# Patient Record
Sex: FEMALE | URBAN - NONMETROPOLITAN AREA
[De-identification: names, ages, dates, MRNs, and addresses within clinical notes are randomized per-mention and may not be internally consistent; named-entity substitution may affect disease eponyms.]

---

## 2023-08-10 ENCOUNTER — TELEPHONE (OUTPATIENT)
Dept: ENDOCRINOLOGY | Facility: CLINIC | Age: 58
End: 2023-08-10
Payer: OTHER GOVERNMENT

## 2023-08-10 NOTE — TELEPHONE ENCOUNTER
"This pt called stating she needs blood work done before December because her doctor want a \"specialty\" doctor to check out her calcium. Pt was told she would be put on a cancellation list. Pt was very frustrated when she was told her appt will be in December. Pt is  very worried and said she recently broke both ankles. Pt was told we have a lot of new pts that want to get in sooner. Pt was told we would do our best to watch for cancellations.      Please advise    Thank you  "

## 2023-08-22 ENCOUNTER — TELEPHONE (OUTPATIENT)
Dept: ENDOCRINOLOGY | Facility: CLINIC | Age: 58
End: 2023-08-22
Payer: OTHER GOVERNMENT

## 2023-08-22 NOTE — TELEPHONE ENCOUNTER
Ms. Daylin Robles called and she is a new patient with Dr. Vega.  She was wanting to know if all paperwork as been received.  If not could you please contact her and let her know.  Her appt is this Thursday 8/24/23.  She was also wanting to know if she needs to not eat in case Dr. Vega needs to do labs.  She stated that she has not heard weather she needs to or not.    Pt call back [phone]

## 2023-08-24 ENCOUNTER — OFFICE VISIT (OUTPATIENT)
Dept: ENDOCRINOLOGY | Facility: CLINIC | Age: 58
End: 2023-08-24
Payer: OTHER GOVERNMENT

## 2023-08-24 ENCOUNTER — LAB (OUTPATIENT)
Dept: LAB | Facility: HOSPITAL | Age: 58
End: 2023-08-24
Payer: OTHER GOVERNMENT

## 2023-08-24 VITALS
DIASTOLIC BLOOD PRESSURE: 70 MMHG | WEIGHT: 188 LBS | SYSTOLIC BLOOD PRESSURE: 120 MMHG | HEIGHT: 65 IN | HEART RATE: 89 BPM | BODY MASS INDEX: 31.32 KG/M2 | OXYGEN SATURATION: 97 %

## 2023-08-24 DIAGNOSIS — E55.9 VITAMIN D DEFICIENCY: ICD-10-CM

## 2023-08-24 DIAGNOSIS — M80.80XA LOCALIZED OSTEOPOROSIS WITH CURRENT PATHOLOGICAL FRACTURE, INITIAL ENCOUNTER: Primary | ICD-10-CM

## 2023-08-24 DIAGNOSIS — E21.1 SECONDARY HYPERPARATHYROIDISM, NON-RENAL: ICD-10-CM

## 2023-08-24 LAB
25(OH)D3 SERPL-MCNC: 32.8 NG/ML (ref 30–100)
ALBUMIN SERPL-MCNC: 4.6 G/DL (ref 3.5–5.2)
ALBUMIN/GLOB SERPL: 1.5 G/DL
ALP SERPL-CCNC: 85 U/L (ref 39–117)
ALT SERPL W P-5'-P-CCNC: 19 U/L (ref 1–33)
ANION GAP SERPL CALCULATED.3IONS-SCNC: 15 MMOL/L (ref 5–15)
AST SERPL-CCNC: 23 U/L (ref 1–32)
BASOPHILS # BLD AUTO: 0.06 10*3/MM3 (ref 0–0.2)
BASOPHILS NFR BLD AUTO: 0.7 % (ref 0–1.5)
BILIRUB SERPL-MCNC: 0.2 MG/DL (ref 0–1.2)
BUN SERPL-MCNC: 11 MG/DL (ref 6–20)
BUN/CREAT SERPL: 13.1 (ref 7–25)
CA-I BLD-MCNC: 5 MG/DL (ref 4.6–5.4)
CA-I SERPL ISE-MCNC: 1.26 MMOL/L (ref 1.15–1.35)
CALCIUM SPEC-SCNC: 9.3 MG/DL (ref 8.6–10.5)
CALCIUM SPEC-SCNC: 9.4 MG/DL (ref 8.6–10.5)
CHLORIDE SERPL-SCNC: 107 MMOL/L (ref 98–107)
CO2 SERPL-SCNC: 20 MMOL/L (ref 22–29)
CORTIS AM PEAK SERPL-MCNC: 10.14 MCG/DL
CREAT SERPL-MCNC: 0.84 MG/DL (ref 0.57–1)
DEPRECATED RDW RBC AUTO: 41.2 FL (ref 37–54)
EGFRCR SERPLBLD CKD-EPI 2021: 80.7 ML/MIN/1.73
EOSINOPHIL # BLD AUTO: 0.07 10*3/MM3 (ref 0–0.4)
EOSINOPHIL NFR BLD AUTO: 0.8 % (ref 0.3–6.2)
ERYTHROCYTE [DISTWIDTH] IN BLOOD BY AUTOMATED COUNT: 12.7 % (ref 12.3–15.4)
GLOBULIN UR ELPH-MCNC: 3.1 GM/DL
GLUCOSE SERPL-MCNC: 112 MG/DL (ref 65–99)
HCT VFR BLD AUTO: 41.6 % (ref 34–46.6)
HGB BLD-MCNC: 13.9 G/DL (ref 12–15.9)
IMM GRANULOCYTES # BLD AUTO: 0.02 10*3/MM3 (ref 0–0.05)
IMM GRANULOCYTES NFR BLD AUTO: 0.2 % (ref 0–0.5)
LYMPHOCYTES # BLD AUTO: 1.71 10*3/MM3 (ref 0.7–3.1)
LYMPHOCYTES NFR BLD AUTO: 20.2 % (ref 19.6–45.3)
MCH RBC QN AUTO: 29.4 PG (ref 26.6–33)
MCHC RBC AUTO-ENTMCNC: 33.4 G/DL (ref 31.5–35.7)
MCV RBC AUTO: 88.1 FL (ref 79–97)
MONOCYTES # BLD AUTO: 0.46 10*3/MM3 (ref 0.1–0.9)
MONOCYTES NFR BLD AUTO: 5.4 % (ref 5–12)
NEUTROPHILS NFR BLD AUTO: 6.13 10*3/MM3 (ref 1.7–7)
NEUTROPHILS NFR BLD AUTO: 72.7 % (ref 42.7–76)
NRBC BLD AUTO-RTO: 0 /100 WBC (ref 0–0.2)
PLATELET # BLD AUTO: 411 10*3/MM3 (ref 140–450)
PMV BLD AUTO: 11.3 FL (ref 6–12)
POTASSIUM SERPL-SCNC: 3.7 MMOL/L (ref 3.5–5.2)
PROT SERPL-MCNC: 7.7 G/DL (ref 6–8.5)
PTH-INTACT SERPL-MCNC: 66.2 PG/ML (ref 15–65)
RBC # BLD AUTO: 4.72 10*6/MM3 (ref 3.77–5.28)
SODIUM SERPL-SCNC: 142 MMOL/L (ref 136–145)
WBC NRBC COR # BLD: 8.45 10*3/MM3 (ref 3.4–10.8)

## 2023-08-24 PROCEDURE — 82570 ASSAY OF URINE CREATININE: CPT | Performed by: INTERNAL MEDICINE

## 2023-08-24 PROCEDURE — 85025 COMPLETE CBC W/AUTO DIFF WBC: CPT | Performed by: INTERNAL MEDICINE

## 2023-08-24 PROCEDURE — 82652 VIT D 1 25-DIHYDROXY: CPT | Performed by: INTERNAL MEDICINE

## 2023-08-24 PROCEDURE — 83970 ASSAY OF PARATHORMONE: CPT | Performed by: INTERNAL MEDICINE

## 2023-08-24 PROCEDURE — 83937 ASSAY OF OSTEOCALCIN: CPT | Performed by: INTERNAL MEDICINE

## 2023-08-24 PROCEDURE — 82523 COLLAGEN CROSSLINKS: CPT | Performed by: INTERNAL MEDICINE

## 2023-08-24 PROCEDURE — 82330 ASSAY OF CALCIUM: CPT | Performed by: INTERNAL MEDICINE

## 2023-08-24 PROCEDURE — 82024 ASSAY OF ACTH: CPT | Performed by: INTERNAL MEDICINE

## 2023-08-24 PROCEDURE — 82306 VITAMIN D 25 HYDROXY: CPT | Performed by: INTERNAL MEDICINE

## 2023-08-24 PROCEDURE — 36415 COLL VENOUS BLD VENIPUNCTURE: CPT | Performed by: INTERNAL MEDICINE

## 2023-08-24 PROCEDURE — 86364 TISS TRNSGLTMNASE EA IG CLAS: CPT | Performed by: INTERNAL MEDICINE

## 2023-08-24 PROCEDURE — 82533 TOTAL CORTISOL: CPT | Performed by: INTERNAL MEDICINE

## 2023-08-24 PROCEDURE — 84080 ASSAY ALKALINE PHOSPHATASES: CPT | Performed by: INTERNAL MEDICINE

## 2023-08-24 PROCEDURE — 82784 ASSAY IGA/IGD/IGG/IGM EACH: CPT | Performed by: INTERNAL MEDICINE

## 2023-08-24 PROCEDURE — 86258 DGP ANTIBODY EACH IG CLASS: CPT | Performed by: INTERNAL MEDICINE

## 2023-08-24 PROCEDURE — 80053 COMPREHEN METABOLIC PANEL: CPT | Performed by: INTERNAL MEDICINE

## 2023-08-24 RX ORDER — FEXOFENADINE HCL 180 MG/1
180 TABLET ORAL DAILY
COMMUNITY

## 2023-08-24 RX ORDER — AMLODIPINE BESYLATE 10 MG/1
10 TABLET ORAL DAILY
COMMUNITY

## 2023-08-24 RX ORDER — PAROXETINE 30 MG/1
30 TABLET, FILM COATED ORAL EVERY MORNING
COMMUNITY

## 2023-08-24 RX ORDER — DENOSUMAB 60 MG/ML
INJECTION SUBCUTANEOUS ONCE
COMMUNITY

## 2023-08-24 RX ORDER — BENAZEPRIL HYDROCHLORIDE 20 MG/1
20 TABLET ORAL DAILY
COMMUNITY

## 2023-08-24 RX ORDER — BUSPIRONE HYDROCHLORIDE 15 MG/1
15 TABLET ORAL 2 TIMES DAILY
COMMUNITY

## 2023-08-24 RX ORDER — HYDROXYZINE PAMOATE 25 MG/1
25 CAPSULE ORAL NIGHTLY
COMMUNITY

## 2023-08-24 RX ORDER — FLUTICASONE PROPIONATE 50 MCG
2 SPRAY, SUSPENSION (ML) NASAL DAILY
COMMUNITY

## 2023-08-24 RX ORDER — THIAMINE HCL 100 MG
TABLET ORAL DAILY
COMMUNITY

## 2023-08-24 RX ORDER — ACETAMINOPHEN AND CODEINE PHOSPHATE 300; 30 MG/1; MG/1
1 TABLET ORAL EVERY 4 HOURS PRN
COMMUNITY

## 2023-08-24 NOTE — PROGRESS NOTES
"Chief Complaint   Patient presents with    Abnormal Lab    Osteoporosis         History of Present Illness    58 y.o. female     Osteoporosis , severe    Bilateral ankle fractures upon walking in Nov 2022 , requiring surgery    Hysterectomy at age 35 years old   Mother had Osteoporosis     Outside labs, calcium nl   Nl cmp   PTH 65 w nl calcium and ionized calcium     Never nephrolithiasis   No smoking   Not on HRT   No Chronic Diarrhea    August 2023    Lumbar Spine 0.739 , T score -2.5  Femoral Neck , BMD, 0.530 , Tscore -2.9             ==========================================  Physical Exam  /70   Pulse 89   Ht 163.8 cm (64.5\")   Wt 85.3 kg (188 lb)   SpO2 97%   BMI 31.77 kg/mý   AOx3  No Goiter , no carotid bruit  RRR  CTA  No Edema     ==========================================    Laboratory Workup    No results found for: WBC, HGB, HCT, MCV, PLT    No results found for: GLUCOSE, BUN, CREATININE, EGFRRESULT, EGFR, BCR, NA, K, CL, CO2, CALCIUM, PROTENTOTREF, ANIONGAP, ALBUMIN, LABIL2, BILIRUBIN, AST, ALT      No results found for: EGFR      ==========================================      ICD-10-CM ICD-9-CM   1. Localized osteoporosis with current pathological fracture, initial encounter  M80.80XA 733.09     733.10   2. Vitamin D deficiency  E55.9 268.9   3. Secondary hyperparathyroidism, non-renal  E21.1 252.02       Osteoporosis  Most likely related to estrogen deficiency since age 35 along w genetics    Taking citracal plus D BID and prolia, agree    Workup as below    PTH elevation most likely secondary but will be open to possibility of normocalcemic PHPT    Addendum will be written after I have labs       Orders Placed This Encounter   Procedures    CBC Auto Differential     Order Specific Question:   Release to patient     Answer:   Routine Release [[id number]]    Comprehensive Metabolic Panel     Order Specific Question:   Release to patient     Answer:   Routine Release [[id number]]    " Calcium, Urine, 24 Hour - Urine, Clean Catch     Order Specific Question:   Release to patient     Answer:   Routine Release [[id number]]    Celiac Panel Reflex To Titer     Order Specific Question:   Release to patient     Answer:   Routine Release [[id number]]    Creatinine Urine 24 hour (kidney function) GFR component - Urine, Clean Catch     Order Specific Question:   Release to patient     Answer:   Routine Release [[id number]]    N-Telopeptide, Urine - Urine, Clean Catch     Order Specific Question:   Release to patient     Answer:   Routine Release [[id number]]    C-Telopeptide, Serum     Order Specific Question:   Release to patient     Answer:   Routine Release [[id number]]    PTH, Intact & Calcium     Order Specific Question:   Release to patient     Answer:   Routine Release [[id number]]    Calcitriol (1,25 di-OH Vitamin D)     Order Specific Question:   Release to patient     Answer:   Routine Release [[id number]]    Vitamin D,25-Hydroxy     Order Specific Question:   Release to patient     Answer:   Routine Release [[id number]]    ACTH     Order Specific Question:   Release to patient     Answer:   Routine Release [[id number]]    Cortisol - AM     Order Specific Question:   Release to patient     Answer:   Routine Release [[id number]]    Cortisol, Urine, Free 24Hr - Urine, Clean Catch     Order Specific Question:   Release to patient     Answer:   Routine Release [[id number]]    Citrate, Urine, 24 Hour - Urine, Clean Catch     Order Specific Question:   Release to patient     Answer:   Routine Release [[id number]]    Sodium, Urine, 24 Hour - Urine, Clean Catch     Order Specific Question:   Release to patient     Answer:   Routine Release [[id number]]    Oxalate, Urine, 24 Hour - Urine, Clean Catch     Order Specific Question:   Release to patient     Answer:   Routine Release [[id number]]    Uric Acid, Urine, 24 Hour - Urine, Clean Catch     Order Specific Question:   Release to patient     Answer:    Routine Release [[id number]]    Phosphorus, 24 Hour Urine - Urine, Clean Catch     Order Specific Question:   Release to patient     Answer:   Routine Release [[id number]]    Osteocalcin     Please do all labs under my name     Order Specific Question:   Release to patient     Answer:   Routine Release [[id number]]    Calcium, Ionized     Please do all labs under my name     Order Specific Question:   Release to patient     Answer:   Routine Release [[id number]]    Alkaline Phosphatase, Bone Specific     Please do all labs under my name     Order Specific Question:   Release to patient     Answer:   Routine Release [[id number]]     No orders of the defined types were placed in this encounter.              This document has been electronically signed by Antony Vega MD on August 24, 2023 10:21 CDT

## 2023-08-26 LAB
1,25(OH)2D SERPL-MCNC: 88.6 PG/ML (ref 24.8–81.5)
ACTH PLAS-MCNC: 10.3 PG/ML (ref 7.2–63.3)
GLIADIN PEPTIDE IGA SER-ACNC: 5 UNITS (ref 0–19)
IGA SERPL-MCNC: 118 MG/DL (ref 87–352)
TTG IGA SER-ACNC: <2 U/ML (ref 0–3)

## 2023-08-28 ENCOUNTER — LAB (OUTPATIENT)
Dept: LAB | Facility: HOSPITAL | Age: 58
End: 2023-08-28
Payer: OTHER GOVERNMENT

## 2023-08-28 LAB
ALP BONE SERPL-MCNC: 15.3 UG/L
COLLAGEN NTX UR-SCNC: 163 NMOL BCE
COLLAGEN NTX/CREAT UR-SRTO: 12 NM BCE/MM CR (ref 0–89)
CREAT UR-MCNC: 149.6 MG/DL
INTERPRETIVE GUIDE:: NORMAL
OSTEOCALCIN SERPL-MCNC: 16.1 NG/ML

## 2023-08-28 PROCEDURE — 84560 ASSAY OF URINE/URIC ACID: CPT | Performed by: INTERNAL MEDICINE

## 2023-08-28 PROCEDURE — 82340 ASSAY OF CALCIUM IN URINE: CPT | Performed by: INTERNAL MEDICINE

## 2023-08-28 PROCEDURE — 84300 ASSAY OF URINE SODIUM: CPT | Performed by: INTERNAL MEDICINE

## 2023-08-28 PROCEDURE — 82530 CORTISOL FREE: CPT | Performed by: INTERNAL MEDICINE

## 2023-08-28 PROCEDURE — 84105 ASSAY OF URINE PHOSPHORUS: CPT | Performed by: INTERNAL MEDICINE

## 2023-08-28 PROCEDURE — 83945 ASSAY OF OXALATE: CPT | Performed by: INTERNAL MEDICINE

## 2023-08-28 PROCEDURE — 82570 ASSAY OF URINE CREATININE: CPT | Performed by: INTERNAL MEDICINE

## 2023-08-28 PROCEDURE — 82507 ASSAY OF CITRATE: CPT | Performed by: INTERNAL MEDICINE

## 2023-08-28 PROCEDURE — 81050 URINALYSIS VOLUME MEASURE: CPT | Performed by: INTERNAL MEDICINE

## 2023-08-29 LAB
CALCIUM 24H UR-MCNC: 4.4 MG/DL
CALCIUM 24H UR-MRATE: 132 MG/24 HR (ref 100–300)
COLLAGEN CTX SERPL-MCNC: 172 PG/ML
COLLECT DURATION TIME UR: 24 HRS
CREAT UR-MCNC: 27.7 MG/DL
CREATINE 24H UR-MRATE: 0.83 G/24 HR (ref 0.7–1.6)
SODIUM 24H UR-SRATE: 171 MMOL/24HRS (ref 40–220)
SODIUM UR-SCNC: 57 MMOL/L
SPECIMEN VOL 24H UR: 3000 ML

## 2023-08-30 LAB
PHOSPHATE 24H UR-MRATE: 426 MG/24 HR (ref 261–1078)
PHOSPHATE UR-MCNC: 14.2 MG/DL
URATE 24H UR-MRATE: 321 MG/24 HR (ref 173.7–902.1)
URATE UR-MCNC: 10.7 MG/DL

## 2023-08-31 LAB
CITRATE 24H UR-MCNC: 77 MG/L
CITRATE 24H UR-MRATE: 231 MG/24 HR (ref 320–1240)

## 2023-09-01 LAB
CORTIS F 24H UR-MCNC: 4 UG/L
CORTIS F 24H UR-MRATE: 12 UG/24 HR (ref 6–42)

## 2023-09-05 LAB
OXALATE 24H UR-MRATE: 12 MG/24 HR (ref 4–31)
OXALATE UR-MCNC: 4 MG/L

## 2023-09-07 ENCOUNTER — TELEMEDICINE (OUTPATIENT)
Dept: ENDOCRINOLOGY | Facility: CLINIC | Age: 58
End: 2023-09-07
Payer: OTHER GOVERNMENT

## 2023-09-07 DIAGNOSIS — E55.9 VITAMIN D DEFICIENCY: ICD-10-CM

## 2023-09-07 DIAGNOSIS — M80.80XA LOCALIZED OSTEOPOROSIS WITH CURRENT PATHOLOGICAL FRACTURE, INITIAL ENCOUNTER: Primary | ICD-10-CM

## 2023-09-07 DIAGNOSIS — E21.1 SECONDARY HYPERPARATHYROIDISM, NON-RENAL: ICD-10-CM

## 2023-09-07 NOTE — PROGRESS NOTES
CC    Osteoporosis         History of Present Illness    58 y.o. female     Osteoporosis , severe    Bilateral ankle fractures upon walking in Nov 2022 , requiring surgery    Hysterectomy at age 35 years old   Mother had Osteoporosis     Outside labs, calcium nl   Nl cmp   PTH 65 w nl calcium and ionized calcium     Never nephrolithiasis   No smoking   Not on HRT   No Chronic Diarrhea    August 2023    Lumbar Spine 0.739 , T score -2.5  Femoral Neck , BMD, 0.530 , Tscore -2.9             ==========================================  Physical Exam  There were no vitals taken for this visit.  AOx3  No Goiter , no carotid bruit  RRR  CTA  No Edema     ==========================================    Laboratory Workup    Lab Results   Component Value Date    WBC 8.45 08/24/2023    HGB 13.9 08/24/2023    HCT 41.6 08/24/2023    MCV 88.1 08/24/2023     08/24/2023       Lab Results   Component Value Date    GLUCOSE 112 (H) 08/24/2023    BUN 11 08/24/2023    CREATININE 0.84 08/24/2023    EGFR 80.7 08/24/2023    BCR 13.1 08/24/2023     08/24/2023    K 3.7 08/24/2023     08/24/2023    CO2 20.0 (L) 08/24/2023    CALCIUM 9.3 08/24/2023    CALCIUM 9.4 08/24/2023    ANIONGAP 15.0 08/24/2023    ALBUMIN 4.6 08/24/2023    AST 23 08/24/2023    ALT 19 08/24/2023         Lab Results   Component Value Date    EGFR 80.7 08/24/2023         ==========================================      ICD-10-CM ICD-9-CM   1. Localized osteoporosis with current pathological fracture, initial encounter  M80.80XA 733.09     733.10   2. Vitamin D deficiency  E55.9 268.9   3. Secondary hyperparathyroidism, non-renal  E21.1 252.02         Osteoporosis  Most likely related to estrogen deficiency since age 35 along w genetics    Taking citracal plus D BID and prolia, agree    Workup as below    PTH elevation most likely secondary to low calcium intake at the time but now on replacement     We ruled out PHPH, celiac disease, Cushing's      --    She  has polyuria but also drinking equivalent volume along w sodas  Suggested to drink up to 2l per day               This document has been electronically signed by Antony Vega MD on September 7, 2023 16:41 CDT